# Patient Record
Sex: MALE | Race: WHITE | HISPANIC OR LATINO | Employment: UNEMPLOYED | URBAN - METROPOLITAN AREA
[De-identification: names, ages, dates, MRNs, and addresses within clinical notes are randomized per-mention and may not be internally consistent; named-entity substitution may affect disease eponyms.]

---
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
## 2024-06-01 ENCOUNTER — HOSPITAL ENCOUNTER (EMERGENCY)
Facility: HOSPITAL | Age: 19
Discharge: HOME | End: 2024-06-01
Payer: COMMERCIAL

## 2024-06-01 VITALS
OXYGEN SATURATION: 98 % | RESPIRATION RATE: 18 BRPM | TEMPERATURE: 98 F | WEIGHT: 191 LBS | BODY MASS INDEX: 27.35 KG/M2 | HEIGHT: 70 IN | SYSTOLIC BLOOD PRESSURE: 137 MMHG | HEART RATE: 60 BPM | DIASTOLIC BLOOD PRESSURE: 87 MMHG

## 2024-06-01 DIAGNOSIS — J02.9 VIRAL PHARYNGITIS: ICD-10-CM

## 2024-06-01 LAB
FLUAV RNA SPEC QL NAA+PROBE: NEGATIVE
FLUBV RNA RESP QL NAA+PROBE: NEGATIVE
GROUP A STREP BY PCR: NOT DETECTED
MONOCYTES NFR BLD AUTO: NEGATIVE %
RSV RNA SPEC NAA+PROBE: NEGATIVE
SARS-COV-2 RNA RESP QL NAA+PROBE: NEGATIVE

## 2024-06-01 PROCEDURE — 87651 STREP A DNA AMP PROBE: CPT

## 2024-06-01 PROCEDURE — 99283 EMERGENCY DEPT VISIT LOW MDM: CPT

## 2024-06-01 PROCEDURE — 86308 HETEROPHILE ANTIBODY SCREEN: CPT

## 2024-06-01 PROCEDURE — 36415 COLL VENOUS BLD VENIPUNCTURE: CPT

## 2024-06-01 PROCEDURE — 250N000013 HC RX MED GY IP 250 OP 250 PS 637

## 2024-06-01 PROCEDURE — 87637 SARSCOV2&INF A&B&RSV AMP PRB: CPT

## 2024-06-01 PROCEDURE — 250N000012 HC RX MED GY IP 250 OP 636 PS 637

## 2024-06-01 RX ORDER — IBUPROFEN 600 MG/1
600 TABLET, FILM COATED ORAL ONCE
Status: COMPLETED | OUTPATIENT
Start: 2024-06-01 | End: 2024-06-01

## 2024-06-01 RX ADMIN — IBUPROFEN 600 MG: 600 TABLET, FILM COATED ORAL at 13:03

## 2024-06-01 RX ADMIN — DEXAMETHASONE 10 MG: 2 TABLET ORAL at 13:03

## 2024-06-01 ASSESSMENT — COLUMBIA-SUICIDE SEVERITY RATING SCALE - C-SSRS
1. IN THE PAST MONTH, HAVE YOU WISHED YOU WERE DEAD OR WISHED YOU COULD GO TO SLEEP AND NOT WAKE UP?: NO
2. HAVE YOU ACTUALLY HAD ANY THOUGHTS OF KILLING YOURSELF IN THE PAST MONTH?: NO
6. HAVE YOU EVER DONE ANYTHING, STARTED TO DO ANYTHING, OR PREPARED TO DO ANYTHING TO END YOUR LIFE?: NO

## 2024-06-01 ASSESSMENT — ACTIVITIES OF DAILY LIVING (ADL)
ADLS_ACUITY_SCORE: 35
ADLS_ACUITY_SCORE: 35

## 2024-06-01 NOTE — ED TRIAGE NOTES
The patient has had a cough and ST for the past weeks. The pt is feeling more fatigued.      Triage Assessment (Adult)       Row Name 06/01/24 1226          Triage Assessment    Airway WDL WDL        Respiratory WDL    Respiratory WDL X;cough        Skin Circulation/Temperature WDL    Skin Circulation/Temperature WDL WDL        Cardiac WDL    Cardiac WDL WDL        Peripheral/Neurovascular WDL    Peripheral Neurovascular WDL WDL        Cognitive/Neuro/Behavioral WDL    Cognitive/Neuro/Behavioral WDL WDL

## 2024-06-01 NOTE — Clinical Note
Simba Mark was seen and treated in our emergency department on 6/1/2024.  He may return to work on 06/03/2024.  ?     If you have any questions or concerns, please don't hesitate to call.      Imani Azevedo PA-C

## 2024-06-01 NOTE — DISCHARGE INSTRUCTIONS
Your emergency department evaluation is reassuring.  Viral testing negative for COVID-19, RSV, and influenza.  Rapid strep throat testing was negative.  Mono testing negative.    For pain, I recommend acetaminophen (500 to 1000 mg every 6 hours) and ibuprofen (400 and milligrams every 6 hours). You may also try warm salt water gargles (1/4 tsp. salt per 8 oz. of water) and throat numbing lozenges.     Call your doctor now or seek immediate medical care if:    You have trouble breathing.     Your sore throat gets much worse on one side.     You have new or worse trouble swallowing.     You have a new or higher fever.   Watch closely for changes in your health, and be sure to contact your doctor if you do not get better as expected.

## 2024-06-01 NOTE — ED PROVIDER NOTES
Emergency Department Encounter  [facility] EMERGENCY DEPARTMENT  Simba Flores   AGE: 18 year old SEX: male  YOB: 2005  MRN: [id number]      EVALUATION DATE & TIME: 6/1/2024 12:27 PM ; PCP: Mary Martin   ED PROVIDER: Imani Azevedo PA-C    CHIEF COMPLAINT: Pharyngitis and Flu Symptoms      MEDICAL DECISION MAKING   Simba Flores is an otherwise healthy 18-year-old male who presents the ED for evaluation of sore throat and fatigue.  Patient reports sore throat initially started 2 to 3 weeks ago and he actually lost his voice.  Sore throat has persisted but patient now has his voice back.  Patient is continuing to experience difficulty swallowing access mucus production.  No confirmed fevers.  Tolerating oral intake without abdominal pain, nausea, vomiting, or diarrhea.  No recent travel or known sick contacts.  No medications taken prior to arrival, did take Mucinex when symptoms first started.    Vitals reviewed and hemodynamically stable, afebrile.  On exam, patient is well-appearing and nontoxic.  No change in voice or evidence of airway compromise.  Oropharynx erythematic, tonsils 2+ bilaterally without exudates.  Uvula midline.  Tolerating secretions.  Lungs CTAB.  No lymphadenopathy.    Presentation consistent with viral pharyngitis.  Rapid strep obtained and was negative.  Viral testing negative for COVID-19, RSV, and influenza.  Mononucleosis screen negative.  No respiratory symptoms and lungs clear on exam, therefore CXR not ordered.  No trismus, hot potato voice, uvular deviation, unilateral tonsillar swelling, toxic appearance, drooling, or pain with movement of trachea to suggest PTA or epiglottitis. No evidence of bacterial infections including peritonsillar abscess, retropharyngeal abscess, epiglottitis.  Considered, but doubt based on evaluation and history collected today, herpangina, hand-foot-and-mouth disease, oral thrush, proctitis, Vincent's  angina, lymphoma, Jairo's angina, or acute HIV.    Patient is tolerating oral intake and reports improved symptoms with dexamethasone given in ED.  Patient has remained hemodynamically stable and without worsening in symptoms throughout ED course, plan to discharge home.  Recommended primary care follow-up for additional evaluation if symptoms do not improve in the next 3 to 5 days. Patient has a clear understanding of the discharge diagnosis, written discharge instructions, and planned follow-up with clear instructions to return to the emergency department if the condition worsens.    Medical Decision Making  Obtained supplemental history:Supplemental history obtained?: Documented in chart and Family Member/Significant Other  Reviewed external records: External records reviewed?: Documented in chart  Care impacted by chronic illness:N/A  Care significantly affected by social determinants of health:N/A  Did you consider but not order tests?: Work up considered but not performed and documented in chart, if applicable  Did you interpret images independently?: Independent interpretation of ECG and images noted in documentation, when applicable.  Consultation discussion with other provider:Did you involve another provider (consultant, MH, pharmacy, etc.)?: No  Discharge. I prescribed additional prescription strength medication(s) as charted. See documentation for any additional details.    FINAL IMPRESSION       ICD-10-CM    1. Viral pharyngitis  J02.9           ED COURSE   12:37 PM I met and introduced myself to the patient. I gathered initial history and performed my physical exam. We discussed plan for initial workup.  1:49 PM I rechecked the patient and discussed results, discharge, follow up, and reasons to return to the ED.     MEDICATIONS GIVEN IN THE EMERGENCY DEPARTMENT:   Medications   dexAMETHasone (DECADRON) tablet 10 mg (10 mg Oral $Given 6/1/24 1953)   ibuprofen (ADVIL/MOTRIN) tablet 600 mg (600 mg Oral  "$Given 6/1/24 1303)      NEW PRESCRIPTIONS STARTED AT TODAY'S ED VISIT:  New Prescriptions    No medications on file           =================================================================         HISTORY OF PRESENT ILLNESS   Patient information was obtained from: patient   Use of Intrepreter: N/A     Simba Flores is an otherwise healthy 18-year-old male who presents the ED for evaluation of sore throat and fatigue.  Patient reports sore throat initially started 2 to 3 weeks ago and he actually lost his voice.  Sore throat has persisted but patient now has his voice back.  Patient is continuing to experience difficulty swallowing access mucus production.  No confirmed fevers.  Tolerating oral intake without abdominal pain, nausea, vomiting, or diarrhea.  No recent travel or known sick contacts.  No medications taken prior to arrival, did take Mucinex when symptoms first started.  No chest pain, shortness of breath, or headache.    MEDICAL HISTORY     No past medical history on file.    No past surgical history on file.    No family history on file.         No current outpatient medications on file.      PHYSICAL EXAM   VITALS:  Patient Vitals for the past 24 hrs:   BP Temp Temp src Pulse Resp SpO2 Height Weight   06/01/24 1353 -- 98  F (36.7  C) Oral -- -- -- -- --   06/01/24 1341 -- 98  F (36.7  C) Oral -- -- -- -- --   06/01/24 1337 137/87 -- -- 60 -- 98 % -- --   06/01/24 1300 -- -- -- 76 -- 98 % -- --   06/01/24 1259 131/82 98.8  F (37.1  C) Oral 73 18 99 % -- --   06/01/24 1225 (!) 163/99 -- -- -- -- -- 1.778 m (5' 10\") 86.6 kg (191 lb)   06/01/24 1224 -- 98.8  F (37.1  C) Temporal 76 12 98 % -- --     Body mass index is 27.41 kg/m .     Vitals reviewed. Nursing notes reviewed.  CONSITUTIONAL: Generally well appearing male in no acute distress. Well developed and nourished.   EYES: Eyelids normal in appearance without swelling or lesions. EOM grossly intact. Conjunctivae clear without exudates or " hemorrhage. Sclera non-icteric.   HENT: Head normocephalic, atraumatic. Moist mucous membranes. Nares patent bilaterally. Oral cavity without lesions or nodules. Oropharynx erythematic, tonsils 2+ bilaterally without exudate. Uvula midline. External ear and ear canal non-tender and without swelling. TMs visualized and normal bilaterally without erythema or bulging.   NECK: Supple, gross ROM intact.   RESPIRATORY: Effort normal, speaks in full sentences.  Lungs clear to auscultation bilaterally without rhonchi, wheezes, rales.   CARDIO: Normal heart rate, regular rhythm, no murmurs.   GI: Soft, nondistended. Abdomen is non tender to palpation. No masses, hepatomegaly, or splenomegaly are noted.   MSK: Moving all 4 extremities intentionally and without pain. No joint swelling, redness, or obvious deformity.  SKIN: Warm, dry. No rashes, petechiae, lesions. No cyanosis, pallor, or diaphoresis.  NEURO:  AxOx4. CN II-XII grossly intact, but not individually tested. No focal neurological deficits.   PSYCH: Thoughts linear and responses appropriate. Cooperative. Appropriate mood and affect.     LABS AND IMAGING   All pertinent labs reviewed and interpreted  Labs Ordered and Resulted from Time of ED Arrival to Time of ED Departure   INFLUENZA A/B, RSV, & SARS-COV2 PCR - Normal       Result Value    Influenza A PCR Negative      Influenza B PCR Negative      RSV PCR Negative      SARS CoV2 PCR Negative     MONONUCLEOSIS SCREEN - Normal    Mononucleosis Screen Negative     GROUP A STREPTOCOCCUS PCR THROAT SWAB - Normal    Group A strep by PCR Not Detected         No orders to display       Imani Azevedo PA-C   Emergency Medicine   [facility] EMERGENCY DEPARTMENT  [address] [place] [id number] [phone]  Dept: [phone]      Imani Azevedo PA-C  06/01/24 4067